# Patient Record
Sex: MALE | NOT HISPANIC OR LATINO | URBAN - METROPOLITAN AREA
[De-identification: names, ages, dates, MRNs, and addresses within clinical notes are randomized per-mention and may not be internally consistent; named-entity substitution may affect disease eponyms.]

---

## 2021-09-29 ENCOUNTER — TELEPHONE ENCOUNTER (OUTPATIENT)
Dept: URBAN - METROPOLITAN AREA CLINIC 35 | Facility: CLINIC | Age: 31
End: 2021-09-29

## 2021-11-16 ENCOUNTER — OFFICE VISIT (OUTPATIENT)
Dept: URBAN - METROPOLITAN AREA CLINIC 35 | Facility: CLINIC | Age: 31
End: 2021-11-16

## 2021-11-16 ENCOUNTER — TELEPHONE ENCOUNTER (OUTPATIENT)
Dept: URBAN - METROPOLITAN AREA CLINIC 35 | Facility: CLINIC | Age: 31
End: 2021-11-16

## 2021-11-16 VITALS
BODY MASS INDEX: 29.4 KG/M2 | DIASTOLIC BLOOD PRESSURE: 98 MMHG | WEIGHT: 210 LBS | HEART RATE: 93 BPM | SYSTOLIC BLOOD PRESSURE: 128 MMHG | OXYGEN SATURATION: 98 % | HEIGHT: 71 IN

## 2021-11-16 RX ORDER — DEXTROAMPHETAMINE SACCHARATE, AMPHETAMINE ASPARTATE, DEXTROAMPHETAMINE SULFATE, AND AMPHETAMINE SULFATE 5; 5; 5; 5 MG/1; MG/1; MG/1; MG/1
1 TABLET TABLET ORAL TWICE A DAY
Status: ACTIVE | COMMUNITY

## 2021-11-16 RX ORDER — SODIUM PICOSULFATE, MAGNESIUM OXIDE, AND ANHYDROUS CITRIC ACID 10; 3.5; 12 MG/160ML; G/160ML; G/160ML
AS DIRECTED LIQUID ORAL
Qty: 1 KIT | OUTPATIENT
Start: 2021-11-16

## 2021-11-16 NOTE — EXAM-MIGRATED EXAMINATIONS
GENERAL APPEARANCE: - alert, in no acute distress, well developed, well nourished;   ORAL CAVITY: - mucosa moist;   THROAT: - clear;   NECK/THYROID: - neck supple, full range of motion;   HEART: - no murmurs, regular rate and rhythm, S1, S2 normal;   LUNGS: - clear to auscultation bilaterally, good air movement, no wheezes, rales, rhonchi;   ABDOMEN: - bowel sounds present, no masses palpable, no organomegaly , no rebound tenderness, soft, nontender, nondistended;   RECTAL: - deferred by patient;

## 2021-11-16 NOTE — HPI-MIGRATED HPI
;   ;     Abdominal discomfort : Patient complains of abdominal discomfort in Lower Left and right quadrant . He describes symptoms as a dull ache . He admits symptoms are intermittent and random. He denies any aggravating or associated factors . he denies having any recent abdominal imaging for further evaluation . ;   Constipation :                   31 year old male patient presents for constipation consult. Patient states symptoms began maybe 2-3 months ago .Patient denies taking any medications currently prescribed or OTC for relief. Patient admits having 1 bowel movement a day . Patient admits stools are Thin and intermittently hard _. Patient admits noticing rectal bleeding while wiping after straining that is bright red in color . Patient admits strenuous bowel movements.   Patient denies having recent abdominal Xray.   Patient admits having recent labs. ;

## 2021-12-21 ENCOUNTER — OFFICE VISIT (OUTPATIENT)
Dept: URBAN - METROPOLITAN AREA SURGERY CENTER 8 | Facility: SURGERY CENTER | Age: 31
End: 2021-12-21
Payer: COMMERCIAL

## 2021-12-21 DIAGNOSIS — K59.09 CHANGE IN BOWEL MOVEMENTS INTERMITTENT CONSTIPATION. URGENCY IN THE MORNING.: ICD-10-CM

## 2021-12-21 DIAGNOSIS — K63.89 BACTERIAL OVERGROWTH SYNDROME: ICD-10-CM

## 2021-12-21 DIAGNOSIS — K92.1 ACUTE MELENA: ICD-10-CM

## 2021-12-21 PROCEDURE — 45380 COLONOSCOPY AND BIOPSY: CPT | Performed by: INTERNAL MEDICINE

## 2021-12-21 PROCEDURE — G8907 PT DOC NO EVENTS ON DISCHARG: HCPCS | Performed by: INTERNAL MEDICINE

## 2022-01-14 ENCOUNTER — DASHBOARD ENCOUNTERS (OUTPATIENT)
Age: 32
End: 2022-01-14

## 2022-01-14 ENCOUNTER — OFFICE VISIT (OUTPATIENT)
Dept: URBAN - METROPOLITAN AREA CLINIC 35 | Facility: CLINIC | Age: 32
End: 2022-01-14
Payer: COMMERCIAL

## 2022-01-14 VITALS
OXYGEN SATURATION: 99 % | HEART RATE: 72 BPM | BODY MASS INDEX: 27.58 KG/M2 | DIASTOLIC BLOOD PRESSURE: 77 MMHG | HEIGHT: 71 IN | WEIGHT: 197 LBS | SYSTOLIC BLOOD PRESSURE: 123 MMHG

## 2022-01-14 DIAGNOSIS — K59.00 CONSTIPATION, UNSPECIFIED: ICD-10-CM

## 2022-01-14 DIAGNOSIS — K92.1 MELENA: ICD-10-CM

## 2022-01-14 DIAGNOSIS — K64.0 GRADE I HEMORRHOIDS: ICD-10-CM

## 2022-01-14 DIAGNOSIS — R19.4 CHANGE IN BOWEL HABIT: ICD-10-CM

## 2022-01-14 PROBLEM — 88111009 CHANGE IN BOWEL HABIT: Status: ACTIVE | Noted: 2021-11-16

## 2022-01-14 PROBLEM — 2901004 MELENA: Status: ACTIVE | Noted: 2021-11-16

## 2022-01-14 PROBLEM — 14760008 CONSTIPATION: Status: ACTIVE | Noted: 2021-11-16

## 2022-01-14 PROCEDURE — 99213 OFFICE O/P EST LOW 20 MIN: CPT | Performed by: INTERNAL MEDICINE

## 2022-01-14 RX ORDER — SODIUM PICOSULFATE, MAGNESIUM OXIDE, AND ANHYDROUS CITRIC ACID 10; 3.5; 12 MG/160ML; G/160ML; G/160ML
AS DIRECTED LIQUID ORAL
Qty: 1 KIT | Status: ON HOLD | COMMUNITY
Start: 2021-11-16

## 2022-01-14 RX ORDER — DEXTROAMPHETAMINE SACCHARATE, AMPHETAMINE ASPARTATE, DEXTROAMPHETAMINE SULFATE, AND AMPHETAMINE SULFATE 5; 5; 5; 5 MG/1; MG/1; MG/1; MG/1
1 TABLET TABLET ORAL TWICE A DAY
Status: ACTIVE | COMMUNITY

## 2022-01-14 NOTE — HPI-ABDOMINAL PAIN
He denies any symptoms at this  time.  Last Visit 11.16.2021  Patient complains of abdominal discomfort in Lower Left and right quadrant . He describes symptoms as a dull ache . He admits symptoms are intermittent and random. He denies any aggravating or associated factors . he denies having any recent abdominal imaging for further evaluation

## 2022-01-14 NOTE — HPI-CHANGE IN BOWEL HABITS
Patient presents today for colonoscopy follow up. He had colonoscopy completed on 12.21.2021, with results noted below. Patient denies any complications after procedure. He's currently having 1 bowel movement a day. Stools are normal with the presence of blood. Denies mucus and melena. Patient states he only sees the blood after wiping. He admits any improvements since last visit. Patient states he continues to take the miralax daily.    Last Visit 11.16.2021   31 year old male patient presents for constipation consult. Patient states symptoms began maybe 2-3 months ago .Patient denies taking any medications currently prescribed or OTC for relief. Patient admits having 1 bowel movement a day . Patient admits stools are Thin and intermittently hard _. Patient admits noticing rectal bleeding while wiping after straining that is bright red in color . Patient admits strenuous bowel movements.   Patient denies having recent abdominal Xray.   Patient admits having recent labs.